# Patient Record
Sex: FEMALE | Race: WHITE | NOT HISPANIC OR LATINO
[De-identification: names, ages, dates, MRNs, and addresses within clinical notes are randomized per-mention and may not be internally consistent; named-entity substitution may affect disease eponyms.]

---

## 2018-01-23 ENCOUNTER — APPOINTMENT (OUTPATIENT)
Dept: OTOLARYNGOLOGY | Facility: CLINIC | Age: 68
End: 2018-01-23
Payer: MEDICARE

## 2018-01-23 VITALS
SYSTOLIC BLOOD PRESSURE: 89 MMHG | HEART RATE: 68 BPM | WEIGHT: 124 LBS | HEIGHT: 63 IN | BODY MASS INDEX: 21.97 KG/M2 | TEMPERATURE: 98.7 F | DIASTOLIC BLOOD PRESSURE: 68 MMHG

## 2018-01-23 DIAGNOSIS — R09.82 POSTNASAL DRIP: ICD-10-CM

## 2018-01-23 DIAGNOSIS — Z97.4 PRESENCE OF EXTERNAL HEARING-AID: ICD-10-CM

## 2018-01-23 DIAGNOSIS — Z82.3 FAMILY HISTORY OF STROKE: ICD-10-CM

## 2018-01-23 PROCEDURE — 92550 TYMPANOMETRY & REFLEX THRESH: CPT

## 2018-01-23 PROCEDURE — 99204 OFFICE O/P NEW MOD 45 MIN: CPT

## 2018-01-23 PROCEDURE — 92557 COMPREHENSIVE HEARING TEST: CPT

## 2018-01-29 PROBLEM — Z82.3 FAMILY HISTORY OF CEREBROVASCULAR ACCIDENT (CVA): Status: ACTIVE | Noted: 2018-01-23

## 2018-01-29 PROBLEM — R09.82 POST-NASAL DRIP: Status: ACTIVE | Noted: 2018-01-23

## 2018-01-29 PROBLEM — Z97.4 HEARING AID WORN: Status: ACTIVE | Noted: 2018-01-23

## 2018-03-02 ENCOUNTER — APPOINTMENT (OUTPATIENT)
Dept: OTOLARYNGOLOGY | Facility: CLINIC | Age: 68
End: 2018-03-02
Payer: MEDICARE

## 2018-03-02 VITALS
DIASTOLIC BLOOD PRESSURE: 77 MMHG | WEIGHT: 125 LBS | SYSTOLIC BLOOD PRESSURE: 107 MMHG | HEIGHT: 63 IN | BODY MASS INDEX: 22.15 KG/M2 | HEART RATE: 77 BPM

## 2018-03-02 DIAGNOSIS — K21.9 GASTRO-ESOPHAGEAL REFLUX DISEASE W/OUT ESOPHAGITIS: ICD-10-CM

## 2018-03-02 PROCEDURE — 31575 DIAGNOSTIC LARYNGOSCOPY: CPT

## 2018-03-02 PROCEDURE — 99214 OFFICE O/P EST MOD 30 MIN: CPT | Mod: 25

## 2018-03-02 PROCEDURE — 92557 COMPREHENSIVE HEARING TEST: CPT

## 2018-03-02 PROCEDURE — 92550 TYMPANOMETRY & REFLEX THRESH: CPT

## 2018-03-04 PROBLEM — K21.9 LARYNGOPHARYNGEAL REFLUX (LPR): Status: ACTIVE | Noted: 2018-03-04

## 2018-04-11 ENCOUNTER — APPOINTMENT (OUTPATIENT)
Dept: CT IMAGING | Facility: HOSPITAL | Age: 68
End: 2018-04-11
Payer: MEDICARE

## 2018-04-11 ENCOUNTER — OUTPATIENT (OUTPATIENT)
Dept: OUTPATIENT SERVICES | Facility: HOSPITAL | Age: 68
LOS: 1 days | End: 2018-04-11
Payer: MEDICARE

## 2018-04-11 PROCEDURE — 70486 CT MAXILLOFACIAL W/O DYE: CPT | Mod: 26

## 2018-04-11 PROCEDURE — 70486 CT MAXILLOFACIAL W/O DYE: CPT

## 2018-06-29 ENCOUNTER — APPOINTMENT (OUTPATIENT)
Dept: OTOLARYNGOLOGY | Facility: CLINIC | Age: 68
End: 2018-06-29
Payer: MEDICARE

## 2018-06-29 VITALS
BODY MASS INDEX: 22.15 KG/M2 | WEIGHT: 125 LBS | HEART RATE: 76 BPM | HEIGHT: 63 IN | SYSTOLIC BLOOD PRESSURE: 130 MMHG | DIASTOLIC BLOOD PRESSURE: 76 MMHG

## 2018-06-29 PROCEDURE — 99213 OFFICE O/P EST LOW 20 MIN: CPT

## 2019-05-08 ENCOUNTER — APPOINTMENT (OUTPATIENT)
Dept: PHYSICAL MEDICINE AND REHAB | Facility: CLINIC | Age: 69
End: 2019-05-08
Payer: MEDICARE

## 2019-05-08 VITALS — HEIGHT: 63 IN | SYSTOLIC BLOOD PRESSURE: 92 MMHG | DIASTOLIC BLOOD PRESSURE: 61 MMHG | HEART RATE: 75 BPM

## 2019-05-08 DIAGNOSIS — Z78.9 OTHER SPECIFIED HEALTH STATUS: ICD-10-CM

## 2019-05-08 DIAGNOSIS — R26.89 OTHER ABNORMALITIES OF GAIT AND MOBILITY: ICD-10-CM

## 2019-05-08 DIAGNOSIS — M16.12 UNILATERAL PRIMARY OSTEOARTHRITIS, LEFT HIP: ICD-10-CM

## 2019-05-08 DIAGNOSIS — M25.552 PAIN IN LEFT HIP: ICD-10-CM

## 2019-05-08 DIAGNOSIS — M21.70 UNEQUAL LIMB LENGTH (ACQUIRED), UNSPECIFIED SITE: ICD-10-CM

## 2019-05-08 DIAGNOSIS — B91 MUSCLE WEAKNESS (GENERALIZED): ICD-10-CM

## 2019-05-08 DIAGNOSIS — R26.81 UNSTEADINESS ON FEET: ICD-10-CM

## 2019-05-08 DIAGNOSIS — G89.29 PAIN IN LEFT HIP: ICD-10-CM

## 2019-05-08 DIAGNOSIS — M62.81 MUSCLE WEAKNESS (GENERALIZED): ICD-10-CM

## 2019-05-08 PROCEDURE — 99205 OFFICE O/P NEW HI 60 MIN: CPT

## 2019-05-08 RX ORDER — AZITHROMYCIN 250 MG/1
250 TABLET, FILM COATED ORAL
Qty: 6 | Refills: 0 | Status: DISCONTINUED | COMMUNITY
Start: 2018-05-31 | End: 2019-05-08

## 2019-05-08 RX ORDER — TRIAMTERENE AND HYDROCHLOROTHIAZIDE 37.5; 25 MG/1; MG/1
37.5-25 CAPSULE ORAL DAILY
Qty: 28 | Refills: 5 | Status: DISCONTINUED | COMMUNITY
Start: 2018-03-02 | End: 2019-05-08

## 2019-05-08 RX ORDER — METHYLPREDNISOLONE 4 MG/1
4 TABLET ORAL
Qty: 1 | Refills: 5 | Status: DISCONTINUED | COMMUNITY
Start: 2018-03-02 | End: 2019-05-08

## 2019-05-08 NOTE — PHYSICAL EXAM
[FreeTextEntry1] : .left hip \par poor ROM pain ++\par R hip good ROM \par \par leg length discreapancy  R leg measures 37 1/2 inches \par left leg measuring umbilicus to laterla malleolus 35 inches \par she has a orthotic AFO R for polio foot drop \par wearing built up shoes 2 inch lift \par \par has orthotic for support for hammer toe left foot \par \par \par hips uneven \par R hip elevated \par no scoliosis \par \par limp \par wide gait \par wasted limb from polio R calf atrophy \par \par \par UE full ROM \par NVI hands \par MMT 5/5 UE  [Normal] : Oriented to person, place, and time, insight and judgement were intact and the affect was normal [de-identified] : ABNORMAL [de-identified] : needy many many questions  [de-identified] : ABNORMAL

## 2019-05-08 NOTE — REVIEW OF SYSTEMS
[Joint Stiffness] : joint stiffness [Joint Pain] : joint pain [Difficulty Walking] : difficulty walking [Muscle Weakness] : muscle weakness [Negative] : Heme/Lymph

## 2019-05-08 NOTE — ASSESSMENT
[FreeTextEntry1] : \par PLAN AND RECOMMENDATIONS :\par \par We discussed differential diagnosis and clinical impression\par  I believe she should consider definitive THR \par the built up shoe not practical getting very high not safe \par i measured her today to update her lift but voiced my concerns \par given referral to ortho \par \par Recommend\par THR \par \par  \par  referral to Dr Marinelli \par  hydrotherapy /heat / cold for pain\par  continue fall precautions \par \par wants endocrinology referral  as has osteoporosis she says -Dr Granado \par  relative rest and avoidance of painful activity where possible use cane for safety \par \par  return for follow up prn \par \par \par

## 2019-05-08 NOTE — HISTORY OF PRESENT ILLNESS
[FreeTextEntry1] : Ms. BERNIE WASHINGTON is a very pleasant 68 year female who comes in for evaluation of []  that has been ongoing for []  without any specific injury or inciting event. The pain is located primarily [] intermittent in nature and described as [] . The pain is rated as []/10 during today's visit, and ranges from []/10. The patient's symptoms are aggravated by []  and alleviated by [] . The patient denies any night pain, numbness/tingling, weakness, or bowel/bladder dysfunction. The patient has no other complaints at this time.\par

## 2019-08-22 ENCOUNTER — APPOINTMENT (OUTPATIENT)
Dept: FAMILY MEDICINE | Facility: CLINIC | Age: 69
End: 2019-08-22

## 2019-09-19 ENCOUNTER — OUTPATIENT (OUTPATIENT)
Dept: OUTPATIENT SERVICES | Facility: HOSPITAL | Age: 69
LOS: 1 days | End: 2019-09-19
Payer: MEDICARE

## 2019-09-19 PROCEDURE — 73630 X-RAY EXAM OF FOOT: CPT

## 2019-09-19 PROCEDURE — 73630 X-RAY EXAM OF FOOT: CPT | Mod: 26,RT

## 2019-11-07 ENCOUNTER — APPOINTMENT (OUTPATIENT)
Dept: OTOLARYNGOLOGY | Facility: CLINIC | Age: 69
End: 2019-11-07
Payer: MEDICARE

## 2019-11-07 VITALS
HEIGHT: 63 IN | DIASTOLIC BLOOD PRESSURE: 82 MMHG | HEART RATE: 93 BPM | BODY MASS INDEX: 20.73 KG/M2 | SYSTOLIC BLOOD PRESSURE: 128 MMHG | WEIGHT: 117 LBS

## 2019-11-07 DIAGNOSIS — R09.81 NASAL CONGESTION: ICD-10-CM

## 2019-11-07 PROCEDURE — 99214 OFFICE O/P EST MOD 30 MIN: CPT | Mod: 25

## 2019-11-07 PROCEDURE — 31231 NASAL ENDOSCOPY DX: CPT

## 2019-11-07 NOTE — PHYSICAL EXAM
[de-identified] : 2x2cm lesion tethered to overlying skin, firm, nontender. [Nasal Endoscopy Performed] : nasal endoscopy was performed, see procedure section for findings [Midline] : trachea located in midline position [Normal] : no rashes [FreeTextEntry1] : Ad: external ear wnl, EAC clear and dry, TM intact and mobile, ME clear\par As: external ear wnl, EAC clear and dry, TM intact and hypomobile, ME clear

## 2019-11-07 NOTE — ASSESSMENT
[FreeTextEntry1] : 69F here for initial evaluation. She c/o thick mucus, nasal congestion, crusting, copious phlegm and spitting. These sx have been present for around one week. Prior to this, she had completely lost her voice which has now returned. Three weeks prior to these sx, she developed bad URI with thick mucus, congestion, fever, cough and sore throat; those sx had improved until she lost he voice and developed current sx. She was never on abx. She is now here for evaluation. On a separate note, she has an enlarging mass over her neck she would like addressed. 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 Nasal endoscopy shows a moderate amount of clear mucus throughout either nasal airway w postnasal drip and right osteomeatal obstruction due to a lateralized middle turbinate and mucosal edema. The rest of the head and neck exam, including fiberoptic laryngoscopy, is unremarkable.\par There is sinonasal mucosal edema with likely right sinusitis (maxillary/ethmoid, at least) with robust postnasal drip. Sx have been present for over 3 weeks and she needs inflammatory cycle reset. Will give medrol taper and add zpak as well. Nasal steroid sprays as well. She should improve. RTO 3-4 weeks, or sooner should sx persist or worsen. Regarding the neck lesion, this is adnexal lesion c/w sebaceous cyst. As long as feeling better, can schedule excision on f/u.\par

## 2019-11-07 NOTE — PROCEDURE
[FreeTextEntry3] : Nasal Endoscopy:\par moderate amount of clear mucus throughout either nasal airway suctioned\par right MT lateralized with [facility] obstruction due to edema w clear mucus\par mild postnasal drip\par \par Fiberoptic Laryngoscopy:\par upper airway widely patent\par TVF symmetric and mobile\par no masses/lesions

## 2019-11-07 NOTE — DATA REVIEWED
[de-identified] : CT sinus - normal [de-identified] : Audiogram from 6/27/18 (see HPI)\par \par Audiogram from 3/2018 (poorer hearing at low frequencies when compared to audio 1/2018):\par R: hearing wnl through 500hz sloping to mild to severe SNHL, SRT 25, 100% discrim, type A\par L: mild low frequency SNHL through 1khz, sloping to severe SNHL. SRT 40, 90% discrim, type A\par \par Audiogram from initial visit 1/23/2018:\par hearing wnl sloping to a symmetric mild to severe SNHL. R SRT 30, L SRT 35, 100% discrim. R type A, L type A/As\par

## 2019-11-07 NOTE — CONSULT LETTER
[Dear  ___] : Dear  [unfilled], [Courtesy Letter:] : I had the pleasure of seeing your patient, [unfilled], in my office today. [Sincerely,] : Sincerely, [Consult Closing:] : Thank you very much for allowing me to participate in the care of this patient.  If you have any questions, please do not hesitate to contact me. [Department of Otolaryngology, Head and Neck Surgery] : Department of Otolaryngology, Head and Neck Surgery [Wilfrido Napier MD] : Wilfrido Napier MD  [[facility]] : [facility]

## 2019-11-07 NOTE — HISTORY OF PRESENT ILLNESS
[de-identified] : 69F here for initial evaluation.\par \par She c/o thick mucus, nasal congestion, crusting, copious phlegm and spitting. These sx have been present for around one week. Prior to this, she had completely lost her voice which has now returned. Three weeks prior to these sx, she developed bad URI with thick mucus, congestion, fever, cough and sore throat; those sx had improved until she lost he voice and developed current sx. Was never on abx. 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

## 2019-12-02 ENCOUNTER — EMERGENCY (EMERGENCY)
Facility: HOSPITAL | Age: 69
LOS: 1 days | Discharge: HOME | End: 2019-12-02
Admitting: EMERGENCY MEDICINE
Payer: MEDICARE

## 2019-12-02 VITALS
TEMPERATURE: 98 F | WEIGHT: 115.08 LBS | HEART RATE: 82 BPM | DIASTOLIC BLOOD PRESSURE: 76 MMHG | HEIGHT: 63 IN | OXYGEN SATURATION: 95 % | SYSTOLIC BLOOD PRESSURE: 113 MMHG | RESPIRATION RATE: 18 BRPM

## 2019-12-02 PROCEDURE — 10061 I&D ABSCESS COMP/MULTIPLE: CPT

## 2019-12-02 PROCEDURE — 99285 EMERGENCY DEPT VISIT HI MDM: CPT | Mod: 25

## 2019-12-02 PROCEDURE — 87070 CULTURE OTHR SPECIMN AEROBIC: CPT

## 2019-12-02 PROCEDURE — 99283 EMERGENCY DEPT VISIT LOW MDM: CPT

## 2019-12-02 PROCEDURE — 87205 SMEAR GRAM STAIN: CPT

## 2019-12-02 NOTE — ED PROVIDER NOTE - PATIENT PORTAL LINK FT
You can access the FollowMyHealth Patient Portal offered by [facility] by registering at the following website: [email or web address]. By joining [employer]’s FollowMyHealth portal, you will also be able to view your health information using other applications (apps) compatible with our system.

## 2019-12-02 NOTE — ED ADULT NURSE NOTE - NSIMPLEMENTINTERV_GEN_ALL_ED
Implemented All Universal Safety Interventions:  [place] to call system. Call bell, personal items and telephone within reach. Instruct patient to call for assistance. Room bathroom lighting operational. Non-slip footwear when patient is off stretcher. Physically safe environment: no spills, clutter or unnecessary equipment. Stretcher in lowest position, wheels locked, appropriate side rails in place.

## 2019-12-02 NOTE — ED PROVIDER NOTE - CLINICAL SUMMARY MEDICAL DECISION MAKING FREE TEXT BOX
pt c/o neck abscess - has ent doctor who has told her that it needs to be removed, but reports increased swelling and pain over past few d, so came to ed - on exam + abscess hence ent consulted for I&D, pt c/o neck abscess - has ent doctor who has told her that it needs to be removed, but reports increased swelling and pain over past few d, so came to ed - on exam + abscess hence ent consulted for I&D, will dc w/abx, wound care and f/u w/ent, pt understands and agrees w/plan

## 2019-12-02 NOTE — ED ADULT NURSE NOTE - CHPI ED NUR SYMPTOMS NEG
no purulent drainage/no blood in mucus/no vomiting/no chills/no rectal pain/no drainage/no bleeding at site/no fever

## 2019-12-02 NOTE — ED PROVIDER NOTE - ENMT, MLM
Airway patent, Nasal mucosa clear. Mouth with normal mucosa. Throat has no vesicles, no oropharyngeal exudates and uvula is midline. a 2.5 cm abscess to mid anterior neck, + fluctuance, some local erythema, no induration, no lymphadenopathy b/l

## 2019-12-02 NOTE — ED ADULT TRIAGE NOTE - CHIEF COMPLAINT QUOTE
patient with abscess to neck for the last few years and states over the last 2 weeks it has become painful and bigger. denies fever, chills, difficulty swallowing.

## 2019-12-02 NOTE — ED PROVIDER NOTE - NSFOLLOWUPINSTRUCTIONS_ED_ALL_ED_FT
Incision and Drainage of a Pilonidal Cyst, Care After  This sheet gives you information about how to care for yourself after your procedure. Your health care provider may also give you more specific instructions. If you have problems or questions, contact your health care provider.  What can I expect after the procedure?  After the procedure, it is common to have:  Pain that gets better when you take medicine.Some fluid or blood coming from your wound.Follow these instructions at home:  Medicines   Take over-the-counter and prescription medicines only as told by your health care provider.If you were prescribed an antibiotic medicine, take it as told by your health care provider. Do not stop taking the antibiotic even if you start to feel better.Lifestyle   Do not do activities that irritate or put pressure on your buttocks for about 2 weeks, or as long as told by your health care provider. These activities include bike riding, running, and anything that involves a twisting motion.Do not sit for long periods at a time without getting up to move around.Sleep on your side instead of your back.Avoid wearing tight underwear and tight pants.Bathing   Do not take baths or showers, swim, or use a hot tub until your health care provider approves. This depends on the type of wound you have from surgery.While bathing, clean your buttocks area gently with soap and water.After bathing:  Pat the area dry with a soft, clean towel.Cover the area with a clean bandage (dressing), if told to by your health care provider.General instructions   If you are taking prescription pain medicine, take actions to prevent or treat constipation. Your health care provider may recommend that you:  Drink enough fluid to keep your urine pale yellow. Eat foods that are high in fiber, such as fresh fruits and vegetables, whole grains, and beans. Limit foods that are high in fat and processed sugars, such as fried or sweet foods.Take an over-the-counter or prescription medicine for constipation.You will need to have a caregiver help you manage wound care and dressing changes. Your caregiver should:  Wash his or her hands with soap and water before changing your dressing. If soap and water are not available, your caregiver should use hand .Check your wound every day for signs of infection, such as:  Redness, swelling, or more pain.More fluid or blood.Warmth.Pus or a bad smell.Follow any additional instructions from your health care provider on how to care for your wound, such as wound cleaning, wound flushing (irrigation), or packing your wound with a dressing.Keep all follow-up visits as told by your health care provider. This is important.If you had incision and drainage with wound packing:   Return to your health care provider as instructed to have your packing material changed or removed.Keep the area dry until your packing has been removed.After the packing has been removed, you may start taking showers.If you had marsupialization:   You may start taking showers the day after surgery, or when your health care provider approves.Remove your dressing before you shower, but let the water from the shower moisten your dressing before you remove it. This will make it easier to remove.Ask your health care provider when you can stop using a dressing.If you had incision and drainage without wound packing:   Change your dressing as directed.Leave stitches (sutures), skin glue, or adhesive strips in place. These skin closures may need to stay in place for 2 weeks or longer. If adhesive strip edges start to loosen and curl up, you may trim the loose edges. Do not remove adhesive strips completely unless your health care provider tells you to do that.Contact a health care provider if:  You have redness, swelling, or more pain around your wound.You have more fluid or blood coming from your wound.You have new bleeding from your wound.Your wound feels warm to the touch.There is pus or a bad smell coming from your wound.You have pain that does not get better with medicine.You have a fever or chills.You have muscle aches.You are dizzy.You feel generally sick.Summary  After a procedure to drain a pilonidal cyst, it is common to have some fluid or blood coming from your wound.If you were prescribed an antibiotic medicine, take it as told by your health care provider. Do not stop taking the antibiotic even if you start to feel better.Return to your health care provider as instructed to have any packing material changed or removed.

## 2019-12-02 NOTE — ED PROVIDER NOTE - OBJECTIVE STATEMENT
The pt is a 70 y/o F, who presents to ED c/o cyst to neck that is "rapidly growing" - states that saw ent a wk ago and was told that it needs to be removed, but "it grew in size" since. Pt c/o pain to area, states it is getting bigger, pain is  5/10, constant and throbbing, has not gone back to her ENT doctor (dr jordan). Denies fevers, chills, pus, bleeding, difficulty swallowing, rash.

## 2019-12-02 NOTE — PROGRESS NOTE ADULT - SUBJECTIVE AND OBJECTIVE BOX
70 yo F, NSPMHx, presents to the ED w/ a 4 day history of worsening swelling, pain, and redness associated w/ known anterior neck mass. Patient recently seen by ENT (Dr. Napier) 2 weeks ago for sinusitis and laryngitis following URI. Pt was prescribed azithromycin, medrol dosepak, and steroid nasal sprays. Pt was also evaluated for long standing, superificial midline neck mass at that time, likely sebaceous cyst. Now presents w/ secondary infection of cyst. Denies any previous episodes in the past. Denies fevers, wt loss, dysphagia, odynophagia, dysphonia, dyspnea.     PAST MEDICAL & SURGICAL HISTORY:  Epigastric hernia  Genital herpes  Polio: history of    Allergies    penicillin (Unknown)    Intolerances    Social History:    ICU Vital Signs Last 24 Hrs  T(C): 36.6 (02 Dec 2019 13:25), Max: 36.6 (02 Dec 2019 13:25)  T(F): 97.9 (02 Dec 2019 13:25), Max: 97.9 (02 Dec 2019 13:25)  HR: 82 (02 Dec 2019 13:25) (82 - 82)  BP: 113/76 (02 Dec 2019 13:25) (113/76 - 113/76)  BP(mean): --  ABP: --  ABP(mean): --  RR: 18 (02 Dec 2019 13:25) (18 - 18)  SpO2: 95% (02 Dec 2019 13:25) (95% - 95%)    PE:   Gen: NAD   Neuro: CN II-XII grossly intact b/l   Nose: Clear anteriorly   OC/OP: no obvious mass/lesions, partially edentulous   Neck: 2.5 cm x 2.5 tender, erythematous, firm, mobile, midline, superficial neck mass. Minimally fluctuant on palpation. FROM.   Resp; breathing comfortably on RA     Procedure: I&D superficial neck abscess. 1cc 1% lidocaine w/ 1:100,000 epinephrine injected superficially at area of maximal fluctuance. 18 gauge needle used to aspirate 2 cc of purulent fluid. Cultures sent. 11 blade used to make small incision. Pocket explored w/ clamp. Additional thick sebaceous material expressed. 1/4" plain gauze packing strip placed in pocket.     A/P: 70 yo F, hx of anterior neck mass, likely sebaceous cyst, presents w/ secondary infection and abscess, now s/p I&D.   -Recommend course of antibiotics   -Daily packing change x 48 hours   -Return precautions discussed   -F/u cultures   -Patient to follow up w/ Dr. Napier (ENT) w/in 1 week   -Please page ENT w/ any additional questions/concerns

## 2019-12-03 LAB
GRAM STN FLD: SIGNIFICANT CHANGE UP
SPECIMEN SOURCE: SIGNIFICANT CHANGE UP

## 2019-12-04 LAB
CULTURE RESULTS: SIGNIFICANT CHANGE UP
SPECIMEN SOURCE: SIGNIFICANT CHANGE UP

## 2019-12-05 LAB — CULTURE RESULTS: SIGNIFICANT CHANGE UP

## 2019-12-11 DIAGNOSIS — L02.11 CUTANEOUS ABSCESS OF NECK: ICD-10-CM

## 2019-12-11 DIAGNOSIS — M54.2 CERVICALGIA: ICD-10-CM

## 2019-12-13 ENCOUNTER — APPOINTMENT (OUTPATIENT)
Dept: OTOLARYNGOLOGY | Facility: CLINIC | Age: 69
End: 2019-12-13
Payer: MEDICARE

## 2019-12-13 VITALS
DIASTOLIC BLOOD PRESSURE: 74 MMHG | HEART RATE: 72 BPM | BODY MASS INDEX: 20.91 KG/M2 | SYSTOLIC BLOOD PRESSURE: 103 MMHG | HEIGHT: 63 IN | WEIGHT: 118 LBS

## 2019-12-13 DIAGNOSIS — R22.1 LOCALIZED SWELLING, MASS AND LUMP, NECK: ICD-10-CM

## 2019-12-13 PROCEDURE — 99213 OFFICE O/P EST LOW 20 MIN: CPT

## 2019-12-16 PROBLEM — R22.1 NECK MASS: Status: ACTIVE | Noted: 2019-11-07

## 2019-12-16 NOTE — DATA REVIEWED
[de-identified] : Audiogram from 6/27/18 (see HPI)\par \par Audiogram from 3/2018 (poorer hearing at low frequencies when compared to audio 1/2018):\par R: hearing wnl through 500hz sloping to mild to severe SNHL, SRT 25, 100% discrim, type A\par L: mild low frequency SNHL through 1khz, sloping to severe SNHL. SRT 40, 90% discrim, type A\par \par Audiogram from initial visit 1/23/2018:\par hearing wnl sloping to a symmetric mild to severe SNHL. R SRT 30, L SRT 35, 100% discrim. R type A, L type A/As\par  [de-identified] : CT sinus - normal

## 2019-12-16 NOTE — PHYSICAL EXAM
[de-identified] : 2x2cm firm subcutaneous midline neck lesion tethered to overlying skin, w punctum and crust. firm, nontender, minimal erythema. [Nasal Endoscopy Performed] : nasal endoscopy was performed, see procedure section for findings [Midline] : trachea located in midline position [Normal] : orientation to person, place, and time: normal [FreeTextEntry1] : Ad: external ear wnl, EAC clear and dry, TM intact and mobile, ME clear\par As: external ear wnl, EAC clear and dry, TM intact and hypomobile, ME clear

## 2019-12-16 NOTE — ASSESSMENT
[FreeTextEntry1] : 69F here in followup.\par On 12/2/19 she presented to [facility] with infected sebaceous cyst of midline neck in the setting of painful swelling of known known midline neck cyst. While in ED, underwent I&D; cx only grew coag neg fred. She is now here in followup. Since then, the pain has improved, but there is still swelling and intermittent drainage below a scab that sometimes falls off. She did not take the abx. On exam, there is 2x2cm firm subcutaneous midline neck lesion tethered to overlying skin, w punctum and crust. It is firm, nontender with minimal erythema.The rest of the head and neck exam is unremarkable.\par This is likely a sebaceous cyst which was/is acutely infected. Will give course keflex and also get MRI for completeness since she claims it has been progressively enlarging over the past 1-2 months. RTO after imaging; will schedule excision at that time. All questions answered.

## 2019-12-16 NOTE — HISTORY OF PRESENT ILLNESS
[de-identified] : 69F here in followup.\par \par On 12/2/19 pt presented to [facility] with infected sebaceous cyst of midline neck in the setting of painful swelling of known known midline neck cyst. While in ED, underwent I&D. She is now here in followup.\par Since then, the pain has improved, but there is still swelling and intermittent drainage below a scab that sometimes falls off. 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

## 2020-01-10 ENCOUNTER — APPOINTMENT (OUTPATIENT)
Dept: OTOLARYNGOLOGY | Facility: CLINIC | Age: 70
End: 2020-01-10

## 2022-01-28 ENCOUNTER — APPOINTMENT (OUTPATIENT)
Dept: OTOLARYNGOLOGY | Facility: CLINIC | Age: 72
End: 2022-01-28
Payer: SELF-PAY

## 2022-01-28 ENCOUNTER — APPOINTMENT (OUTPATIENT)
Dept: OTOLARYNGOLOGY | Facility: CLINIC | Age: 72
End: 2022-01-28
Payer: MEDICARE

## 2022-01-28 DIAGNOSIS — Z87.898 PERSONAL HISTORY OF OTHER SPECIFIED CONDITIONS: ICD-10-CM

## 2022-01-28 DIAGNOSIS — M26.622 ARTHRALGIA OF LEFT TEMPOROMANDIBULAR JOINT: ICD-10-CM

## 2022-01-28 DIAGNOSIS — C50.919 MALIGNANT NEOPLASM OF UNSPECIFIED SITE OF UNSPECIFIED FEMALE BREAST: ICD-10-CM

## 2022-01-28 DIAGNOSIS — Z85.3 PERSONAL HISTORY OF MALIGNANT NEOPLASM OF BREAST: ICD-10-CM

## 2022-01-28 DIAGNOSIS — H90.3 SENSORINEURAL HEARING LOSS, BILATERAL: ICD-10-CM

## 2022-01-28 DIAGNOSIS — H69.82 OTHER SPECIFIED DISORDERS OF EUSTACHIAN TUBE, LEFT EAR: ICD-10-CM

## 2022-01-28 DIAGNOSIS — Z86.69 PERSONAL HISTORY OF OTHER DISEASES OF THE NERVOUS SYSTEM AND SENSE ORGANS: ICD-10-CM

## 2022-01-28 DIAGNOSIS — J32.9 CHRONIC SINUSITIS, UNSPECIFIED: ICD-10-CM

## 2022-01-28 DIAGNOSIS — H90.5 UNSPECIFIED SENSORINEURAL HEARING LOSS: ICD-10-CM

## 2022-01-28 DIAGNOSIS — H61.22 IMPACTED CERUMEN, LEFT EAR: ICD-10-CM

## 2022-01-28 DIAGNOSIS — J34.89 OTHER SPECIFIED DISORDERS OF NOSE AND NASAL SINUSES: ICD-10-CM

## 2022-01-28 DIAGNOSIS — H81.02 MENIERE'S DISEASE, LEFT EAR: ICD-10-CM

## 2022-01-28 DIAGNOSIS — H92.02 OTALGIA, LEFT EAR: ICD-10-CM

## 2022-01-28 PROCEDURE — 92550 TYMPANOMETRY & REFLEX THRESH: CPT

## 2022-01-28 PROCEDURE — 92557 COMPREHENSIVE HEARING TEST: CPT

## 2022-01-28 PROCEDURE — V5266A: CUSTOM

## 2022-01-28 PROCEDURE — 31231 NASAL ENDOSCOPY DX: CPT

## 2022-01-28 PROCEDURE — 99214 OFFICE O/P EST MOD 30 MIN: CPT | Mod: 25

## 2022-01-28 PROCEDURE — G0268 REMOVAL OF IMPACTED WAX MD: CPT | Mod: LT

## 2022-01-28 RX ORDER — SODIUM CHLORIDE 0.65 %
0.65 AEROSOL, SPRAY (ML) NASAL
Qty: 1 | Refills: 2 | Status: ACTIVE | COMMUNITY
Start: 2022-01-28 | End: 1900-01-01

## 2022-01-28 RX ORDER — ANASTROZOLE TABLETS 1 MG/1
1 TABLET ORAL
Refills: 0 | Status: ACTIVE | COMMUNITY

## 2022-01-28 RX ORDER — CYANOCOBALAMIN (VITAMIN B-12) 100 MCG/ML
100 VIAL (ML) INJECTION
Refills: 0 | Status: ACTIVE | COMMUNITY

## 2022-01-28 RX ORDER — CEPHALEXIN 250 MG/1
250 CAPSULE ORAL 3 TIMES DAILY
Qty: 21 | Refills: 0 | Status: COMPLETED | COMMUNITY
Start: 2019-12-13 | End: 2022-01-28

## 2022-01-28 RX ORDER — METHYLPREDNISOLONE 4 MG/1
4 TABLET ORAL
Qty: 1 | Refills: 5 | Status: COMPLETED | COMMUNITY
Start: 2018-06-29 | End: 2022-01-28

## 2022-02-01 ENCOUNTER — APPOINTMENT (OUTPATIENT)
Dept: OTOLARYNGOLOGY | Facility: CLINIC | Age: 72
End: 2022-02-01
Payer: MEDICARE

## 2022-02-01 ENCOUNTER — NON-APPOINTMENT (OUTPATIENT)
Age: 72
End: 2022-02-01

## 2022-02-01 PROBLEM — H90.3 SENSORINEURAL HEARING LOSS (SNHL) OF BOTH EARS: Status: ACTIVE | Noted: 2022-02-01

## 2022-02-01 PROBLEM — H81.02 MENIERE'S DISEASE OF LEFT EAR: Status: ACTIVE | Noted: 2018-02-05

## 2022-02-01 PROBLEM — Z86.69 HISTORY OF HEARING LOSS: Status: RESOLVED | Noted: 2018-01-23 | Resolved: 2022-02-01

## 2022-02-01 PROBLEM — H92.02 OTALGIA, LEFT: Status: ACTIVE | Noted: 2022-02-01

## 2022-02-01 PROBLEM — M26.622 ARTHRALGIA OF LEFT TEMPOROMANDIBULAR JOINT: Status: ACTIVE | Noted: 2022-02-01

## 2022-02-01 PROBLEM — H69.82 DYSFUNCTION OF LEFT EUSTACHIAN TUBE: Status: ACTIVE | Noted: 2018-02-05

## 2022-02-01 PROBLEM — H61.22 EXCESSIVE CERUMEN IN EAR CANAL, LEFT: Status: ACTIVE | Noted: 2022-02-01

## 2022-02-01 PROBLEM — J34.89 THICK NASAL MUCUS: Status: RESOLVED | Noted: 2019-11-07 | Resolved: 2022-02-01

## 2022-02-01 PROBLEM — Z87.898 HISTORY OF EPISTAXIS: Status: RESOLVED | Noted: 2018-01-23 | Resolved: 2022-02-01

## 2022-02-01 PROBLEM — H90.5 ASNHL (ASYMMETRICAL SENSORINEURAL HEARING LOSS): Status: ACTIVE | Noted: 2022-02-01

## 2022-02-01 PROBLEM — J32.9 CHRONIC RHINOSINUSITIS: Status: ACTIVE | Noted: 2022-02-01

## 2022-02-01 PROCEDURE — 99443: CPT

## 2022-02-01 NOTE — CONSULT LETTER
[Dear  ___] : Dear  [unfilled], [Courtesy Letter:] : I had the pleasure of seeing your patient, [unfilled], in my office today. [Please see my note below.] : Please see my note below. [Consult Closing:] : Thank you very much for allowing me to participate in the care of this patient.  If you have any questions, please do not hesitate to contact me. [Sincerely,] : Sincerely, [___] : [unfilled] [FreeTextEntry3] : \par Yennifer Sosa MD \par Otolaryngology, Head and Neck Surgery \par \par

## 2022-02-01 NOTE — PHYSICAL EXAM
[Midline] : trachea located in midline position [Normal] : tympanic membranes are normal in both ears [Nasal Endoscopy Performed] : nasal endoscopy was performed, see procedure section for findings [FreeTextEntry1] : No hoarseness.  [de-identified] : Slightly enlarged and tender left submandibular gland. [de-identified] : Slightly tender left TMJ; no click.  Normal right TMJ. [de-identified] : Dry wax on left eardrum was removed with forcep under binocular vision.  Right EAC clear. [de-identified] : Clear saliva from submandibular ducts. [de-identified] : See NECK. Normal other than left submandibular gland. [de-identified] : Carotid pulses 2+ bilateral.

## 2022-02-01 NOTE — ASSESSMENT
[FreeTextEntry1] : 71F here in followup. She saw my partner Dr. Sosa in office last week in my absence. At that time, she c/o gradual worsening in left sided hearing for several days with left ear pain and stuffiness associated with left neck pain and throat soreness. Audiogram at that time, on 1/28/22, was unchanged relative to prior audiogram 3/2018, and she was diagnosed with acute URI and TMJ. Her sx persist 4 days later, no better or worse.\par Additionally, she reports runny nose, nasal congestion and postnasal drip w phlegm she needs to spit which have been present long before acute left ear sx and have worsened over past few months. She grinds her teeth at night and is under lots of stress. She is not on any nasal sprays or medications.\par Of note, I have seen her for sudden hearing loss in 2018 and she took prednisone twice and has presumptive diagnosis of probable Menieres. Additionally, she was diagnosed at end of 2019 with breast cancer, treated with chemoradiation and mastectomy. \par Due to limitations of telemedicine, exam could not be done.\par Agree likely viral URI with chronic rhinitis (had CT sinus 2018 which was normal) and TMJ. Audiogram was unchanged. If sx persist, will see in office later this week for evaluation.

## 2022-02-01 NOTE — HISTORY OF PRESENT ILLNESS
[de-identified] : 71F here in followup.\par \par She saw my partner Dr. Sosa in office last week in my absence. At that time, she c/o gradual worsening in left sided hearing for several days with left ear pain and stuffiness associated with left neck pain and throat soreness. \par Audiogram at that time, on 1/28/22, was unchanged relative to prior audiogram 3/2018, and she was diagnosed with acute URI. Her sx persist 4 days later, no better or worse.\par Additionally, there was runny nose, nasal congestion and postnasal drip w phlegm she needs to spit which have been present long before acute left ear sx and have worsened over past few months.\par \par She grinds her teeth at night and is under lots of stress.\par \par She is not on any nasal sprays or medications.\par \par I have seen her for sudden hearing loss in 2018 and she took Prednisone twice and has presumptive diagnosis of probable Menieres.\par Of note, she was diagnosed at end of 2019 with breast cancer, treated with chemoradiation and mastectomy. \par \par ROS otherwise unremarkable. [Home] : at home, [unfilled] , at the time of the visit. [Medical Office: ([facility])___] : at the medical office located in  [Verbal consent obtained from patient] : the patient, [unfilled]

## 2022-02-01 NOTE — DATA REVIEWED
[de-identified] : \par AUDIOGRAM  (01/28/2022)\par RIGHT:  Hearing within normal limits through 1K, sloping to severe SNHL\par LEFT:   Moderate sloping to severe SNHL\par (Hearing is stable compared to audio from 6/2018.)\par Tympanograms  A  bilateral  \par Word recognition 90%  on  right; 100%  on left\par  \par Outside audiogram 6/27/18 (air line only):\par - Moderate rising to mild low frequency left SNHL, followed by mild downsloping to severe SNHL thereafter.\par \par Audiogram 3/02/2018\par  (poorer hearing at low frequencies when compared to audio 1/2018):\par R: hearing wnl through 500hz sloping to mild to severe SNHL, SRT 25, 100% discrim, type A\par L: mild low frequency SNHL through 1khz, sloping to severe SNHL. SRT 40, 90% discrim, type A\par \par Audiogram from initial visit 1/23/2018:\par - hearing wnl sloping to a symmetric mild to severe SNHL. R SRT 30, L SRT 35, 100% discrim. R type A, L type A/As\par

## 2022-02-01 NOTE — HISTORY OF PRESENT ILLNESS
[de-identified] : Ms. WASHINGTON is a 71 year old woman who presents for hearing loss.\par She previously saw Dr. Napier for hearing loss, sebaceous cyst and nasal problems. 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 Last hearing test was 6/27/18.\par \par Today she has gradual worsening hearing loss on the left ear since Tuesday, 1/25. Since then, she also has left ear pain and stuffiness, and she developed left neck pain and achy feeling in her throat.\par She saw Dr. Napier for sudden hearing loss in 2018 and she took Prednisone twice.\par She also reports grinding her teeth in sleep.\par She always has runny nose and postnasal drip which worsened over past few months. Nasal congestion comes and goes. She spits mucus all the time. Not on any nasal sprays or medications.\par No fever. Vaccinated and boosted for COVID.\par Diagnosed at end of 2019 with breast cancer, treated with chemo,radiation and total mastectomy. \par History of polio.\par

## 2022-02-01 NOTE — HISTORY OF PRESENT ILLNESS
[de-identified] : 71F here in followup.\par \par Lots of mucus, varying in consistency, can awaken her at night, thinks she has sinusitis\par \par Grinds teeth at night; uses nightguard regularly

## 2022-02-01 NOTE — DATA REVIEWED
[de-identified] : AUDIOGRAM  (01/28/2022)\par RIGHT:  Hearing within normal limits through 1K, sloping to severe SNHL\par LEFT:   Moderate sloping to severe SNHL\par (Hearing is stable compared to audio from 6/2018.)\par Tympanograms  A  bilateral  \par Word recognition 90%  on  right; 100%  on left\par  \par Outside audiogram 6/27/18 (air line only):\par - Moderate rising to mild low frequency left SNHL, followed by mild downsloping to severe SNHL thereafter.\par \par Audiogram 3/02/2018\par  (poorer hearing at low frequencies when compared to audio 1/2018):\par R: hearing wnl through 500hz sloping to mild to severe SNHL, SRT 25, 100% discrim, type A\par L: mild low frequency SNHL through 1khz, sloping to severe SNHL. SRT 40, 90% discrim, type A\par \par Audiogram from initial visit 1/23/2018:\par - hearing wnl sloping to a symmetric mild to severe SNHL. R SRT 30, L SRT 35, 100% discrim. R type A, L type A/As

## 2022-02-01 NOTE — ASSESSMENT
[FreeTextEntry1] : Ms. WASHINGTON was evaluated for the following issues today:\par \par 1.) Left ear clogging and worsening hearing in left ear x past 3 days in 70 yo woman with prior hx of Meniere's diagnosis in 2018.  Audiogram today is stable compared to prior audiograms from 2018.  There is still asymmetry in low frequencies on left side.  Word recognition is 90% on right and 100% on left side.\par Her ear clogging may also be caused by increased upper respiratory sx.\par Since hearing is not changed and she has no vertigo, will not prescribe any meds today.\par - low salt and low caffeine diet,which she said that she follows\par \par 2.) Left ear pain seemed relieved after sharp piece of wax was removed from her eardrum.\par She had bruxism, with mild left TMJ tenderness on exam today, so prior ear pain could also be from TMJ inflammation.\par --> TMJ precautions.  Use nightguard.\par \par 3.) increased post nasal drip, nasal congestion and rhinorrhea is likely URI on top of her chronic rhinosinusitis.\par --> start Mucinex\par --> salt water spray several times/day\par --> nasal decongestant spray x 3-4 days\par --> steam inhalation\par --> drink more water\par \par Follow up with Dr. Napier next week if symptoms persist.

## 2022-02-01 NOTE — CONSULT LETTER
[Dear  ___] : Dear  [unfilled], [Courtesy Letter:] : I had the pleasure of seeing your patient, [unfilled], in my office today. [Please see my note below.] : Please see my note below. [Consult Closing:] : Thank you very much for allowing me to participate in the care of this patient.  If you have any questions, please do not hesitate to contact me. [Sincerely,] : Sincerely, [FreeTextEntry3] : \par \par   [Wilfrido Napier MD] : Wilfrido Napier MD  [Department of Otolaryngology, Head and Neck Surgery] : Department of Otolaryngology, Head and Neck Surgery [[facility]] : [facility]

## 2022-02-01 NOTE — CONSULT LETTER
[Dear  ___] : Dear  [unfilled], [Courtesy Letter:] : I had the pleasure of seeing your patient, [unfilled], in my office today. [Please see my note below.] : Please see my note below. [Consult Closing:] : Thank you very much for allowing me to participate in the care of this patient.  If you have any questions, please do not hesitate to contact me. [Sincerely,] : Sincerely, [FreeTextEntry3] : \par \par   [Wilfrido Napier MD] : Wilfrido Napier MD  [Department of Otolaryngology, Head and Neck Surgery] : Department of Otolaryngology, Head and Neck Surgery [[facility]] : [facility]

## 2022-02-01 NOTE — DATA REVIEWED
[de-identified] : AUDIOGRAM  (01/28/2022)\par RIGHT:  Hearing within normal limits through 1K, sloping to severe SNHL\par LEFT:   Moderate sloping to severe SNHL\par (Hearing is stable compared to audio from 6/2018.)\par Tympanograms  A  bilateral  \par Word recognition 90%  on  right; 100%  on left\par  \par Outside audiogram 6/27/18 (air line only):\par - Moderate rising to mild low frequency left SNHL, followed by mild downsloping to severe SNHL thereafter.\par \par Audiogram 3/02/2018\par  (poorer hearing at low frequencies when compared to audio 1/2018):\par R: hearing wnl through 500hz sloping to mild to severe SNHL, SRT 25, 100% discrim, type A\par L: mild low frequency SNHL through 1khz, sloping to severe SNHL. SRT 40, 90% discrim, type A\par \par Audiogram from initial visit 1/23/2018:\par - hearing wnl sloping to a symmetric mild to severe SNHL. R SRT 30, L SRT 35, 100% discrim. R type A, L type A/As

## 2022-02-01 NOTE — HISTORY OF PRESENT ILLNESS
[Home] : at home, [unfilled] , at the time of the visit. [Medical Office: ([facility])___] : at the medical office located in  [Verbal consent obtained from patient] : the patient, [unfilled] [de-identified] : 71F here in followup.\par \par She saw my partner Dr. Sosa in office last week in my absence. At that time, she c/o gradual worsening in left sided hearing for several days with left ear pain and stuffiness associated with left neck pain and throat soreness. \par Audiogram at that time, on 1/28/22, was unchanged relative to prior audiogram 3/2018, and she was diagnosed with acute URI. Her sx persist 4 days later, no better or worse.\par Additionally, there was runny nose, nasal congestion and postnasal drip w phlegm she needs to spit which have been present long before acute left ear sx and have worsened over past few months.\par \par She grinds her teeth at night and is under lots of stress.\par \par She is not on any nasal sprays or medications.\par \par I have seen her for sudden hearing loss in 2018 and she took Prednisone twice and has presumptive diagnosis of probable Menieres.\par Of note, she was diagnosed at end of 2019 with breast cancer, treated with chemoradiation and mastectomy. \par \par ROS otherwise unremarkable.

## 2022-02-01 NOTE — PROCEDURE
[FreeTextEntry6] : \par Indication:  chronic rhinosinusitis\par -Verbal consent was obtained from patient prior to exam. \par - Catrachito-Synephrine spray applied to nose bilaterally.\par Nasal endoscopy was performed with flexible scope.\par Findings: \par -- Inferior turbinates mildly edematous  bilateral.  Inferior meatus clear bilateral.\par -- Septum was S-shaped.\par -- No polyps either side nose\par -- Mucus clear but thick bilateral\par -- Right middle turbinate lateralized by septum; middle meatus congested.\par -- Left middle meatus congested. \par -- SER clear bilateral.\par -- Nasopharynx without mass or exudate.  Adenoids were small\par -- Eustachian orifices were patent bilateral\par \par The patient tolerated the procedure well.\par

## 2022-02-03 ENCOUNTER — APPOINTMENT (OUTPATIENT)
Dept: OTOLARYNGOLOGY | Facility: CLINIC | Age: 72
End: 2022-02-03

## 2022-02-08 ENCOUNTER — APPOINTMENT (OUTPATIENT)
Dept: OTOLARYNGOLOGY | Facility: CLINIC | Age: 72
End: 2022-02-08
Payer: MEDICARE

## 2022-02-08 DIAGNOSIS — F45.8 OTHER SOMATOFORM DISORDERS: ICD-10-CM

## 2022-02-08 DIAGNOSIS — J06.9 ACUTE UPPER RESPIRATORY INFECTION, UNSPECIFIED: ICD-10-CM

## 2022-02-08 PROCEDURE — 99442: CPT

## 2022-02-08 NOTE — DATA REVIEWED
[de-identified] : AUDIOGRAM  (01/28/2022)\par RIGHT:  Hearing within normal limits through 1K, sloping to severe SNHL\par LEFT:   Moderate sloping to severe SNHL\par (Hearing is stable compared to audio from 6/2018.)\par Tympanograms  A  bilateral  \par Word recognition 90%  on  right; 100%  on left\par  \par Outside audiogram 6/27/18 (air line only):\par - Moderate rising to mild low frequency left SNHL, followed by mild downsloping to severe SNHL thereafter.\par \par Audiogram 3/02/2018\par  (poorer hearing at low frequencies when compared to audio 1/2018):\par R: hearing wnl through 500hz sloping to mild to severe SNHL, SRT 25, 100% discrim, type A\par L: mild low frequency SNHL through 1khz, sloping to severe SNHL. SRT 40, 90% discrim, type A\par \par Audiogram from initial visit 1/23/2018:\par - hearing wnl sloping to a symmetric mild to severe SNHL. R SRT 30, L SRT 35, 100% discrim. R type A, L type A/As

## 2022-02-08 NOTE — HISTORY OF PRESENT ILLNESS
[Home] : at home, [unfilled] , at the time of the visit. [Medical Office: ([facility])___] : at the medical office located in  [Verbal consent obtained from patient] : the patient, [unfilled] [de-identified] : 71F here in followup.\par \par Since last encounter, pt sx remain 'about the same.' Her left ear remains 'packed' with 'distorted sound.' This is intermittent, but persists.\par She initially saw my partner Dr. Sosa in office 1/28/22 in my absence. At that time, she c/o gradual worsening in left sided hearing for several days with left ear pain and stuffiness associated with left neck pain and throat soreness. \par Audiogram at that time, on 1/28/22, was unchanged relative to prior audiogram 3/2018, and she was diagnosed with acute URI. Her sx now persist 10 days later, no better or worse. I had told her to come to office if that was the case, but she has not.\par \par Additionally, there was runny nose, nasal congestion and postnasal drip w phlegm she needs to spit which have been present long before acute left ear sx and have worsened over past few months.\par \par She grinds her teeth at night and is under lots of stress.\par \par She is not on any nasal sprays or medications.\par \par I have seen her for sudden hearing loss in 2018 and she took Prednisone twice and has presumptive diagnosis of probable Menieres.\par Of note, she was diagnosed at end of 2019 with breast cancer, treated with chemoradiation and mastectomy. \par \par ROS otherwise unremarkable.

## 2022-02-08 NOTE — CONSULT LETTER
[Dear  ___] : Dear  [unfilled], [Courtesy Letter:] : I had the pleasure of seeing your patient, [unfilled], in my office today. [Please see my note below.] : Please see my note below. [Consult Closing:] : Thank you very much for allowing me to participate in the care of this patient.  If you have any questions, please do not hesitate to contact me. [Sincerely,] : Sincerely, [FreeTextEntry3] : \par \par   [Wilfrido Napier MD] : Wilfrido Napier MD  [Department of Otolaryngology, Head and Neck Surgery] : Department of Otolaryngology, Head and Neck Surgery [[facility]] : [facility]

## 2022-02-08 NOTE — ASSESSMENT
[FreeTextEntry1] : 71F here in followup. Since last encounter 7 days prior, pt sx remain 'about the same.' Her left ear remains 'packed' with 'distorted sound.' This is intermittent, but persists. She initially saw my partner Dr. Sosa in office 1/28/22 in my absence. At that time, she c/o gradual worsening in left sided hearing for several days with left ear pain and stuffiness associated with left neck pain and throat soreness. Audiogram at that time, on 1/28/22, was unchanged relative to prior audiogram 3/2018, and she was diagnosed with acute URI. Her sx now persist 10 days later, no better or worse. I had told her to come to office if that was the case, but she has not.\par Additionally, she reports runny nose, nasal congestion and postnasal drip w phlegm she needs to spit which have been present long before acute left ear sx and have worsened over past few months. She grinds her teeth at night and is under lots of stress. She is not on any nasal sprays or medications.\par Of note, I have seen her for sudden hearing loss in 2018 and she took prednisone twice and has presumptive diagnosis of probable Menieres. Additionally, she was diagnosed at end of 2019 with breast cancer, treated with chemoradiation and mastectomy and may be starting a new medication for her breast cancer next month.\par Due to limitations of telemedicine, exam could not be done.\par Agree likely viral URI with chronic rhinitis (had CT sinus 2018 which was normal) and TMJ. Audiogram from 1/28/22 was unchanged. I again reiterated that since sx persist, will need to see in office later this week for evaluation - she will call for appointment to be seen in next few days.

## 2023-05-22 ENCOUNTER — APPOINTMENT (OUTPATIENT)
Dept: PHYSICAL MEDICINE AND REHAB | Facility: CLINIC | Age: 73
End: 2023-05-22

## 2024-07-11 NOTE — ED ADULT TRIAGE NOTE - NS ED NURSE BANDS TYPE
Name band; Detail Level: Simple Render Risk Assessment In Note?: yes Additional Notes: Pt reports no longer using ketoconazole shampoo to wash body \\nRecommended OTC \\nF/U PRN

## 2024-07-18 ENCOUNTER — APPOINTMENT (OUTPATIENT)
Dept: OTOLARYNGOLOGY | Facility: CLINIC | Age: 74
End: 2024-07-18
Payer: MEDICARE

## 2024-07-18 DIAGNOSIS — H81.02 MENIERE'S DISEASE, LEFT EAR: ICD-10-CM

## 2024-07-18 PROCEDURE — 92557 COMPREHENSIVE HEARING TEST: CPT

## 2024-07-18 PROCEDURE — 92567 TYMPANOMETRY: CPT

## 2024-07-18 PROCEDURE — 99214 OFFICE O/P EST MOD 30 MIN: CPT

## 2024-08-01 ENCOUNTER — NON-APPOINTMENT (OUTPATIENT)
Age: 74
End: 2024-08-01

## 2024-08-23 NOTE — ED PROVIDER NOTE - CHIEF COMPLAINT
Called Pending sale to [facility] and was told the insurance wouldn't cover the Morphine, $14.58 with discount card. Narcan is also not covered, can be purchased OTC for $44.00. This information shared via patients voice mail. Option to ask for prior authorization for the Morphine or pay cash. Encouraged a return call if needed.    The patient is a 69y Female complaining of abscess.

## 2025-07-08 ENCOUNTER — APPOINTMENT (OUTPATIENT)
Dept: OTOLARYNGOLOGY | Facility: CLINIC | Age: 75
End: 2025-07-08